# Patient Record
Sex: FEMALE | Race: WHITE | NOT HISPANIC OR LATINO
[De-identification: names, ages, dates, MRNs, and addresses within clinical notes are randomized per-mention and may not be internally consistent; named-entity substitution may affect disease eponyms.]

---

## 2019-09-22 ENCOUNTER — RESULT REVIEW (OUTPATIENT)
Age: 54
End: 2019-09-22

## 2021-04-08 ENCOUNTER — APPOINTMENT (OUTPATIENT)
Dept: GASTROENTEROLOGY | Facility: CLINIC | Age: 56
End: 2021-04-08
Payer: COMMERCIAL

## 2021-04-08 VITALS
OXYGEN SATURATION: 96 % | HEART RATE: 103 BPM | TEMPERATURE: 97.1 F | BODY MASS INDEX: 38.45 KG/M2 | SYSTOLIC BLOOD PRESSURE: 150 MMHG | WEIGHT: 245 LBS | HEIGHT: 67 IN | DIASTOLIC BLOOD PRESSURE: 90 MMHG

## 2021-04-08 DIAGNOSIS — R11.0 NAUSEA: ICD-10-CM

## 2021-04-08 DIAGNOSIS — K35.32 ACUTE APPENDICITIS W/ PERFORATION AND LOCALIZED PERITONITIS, W/O ABSCESS: ICD-10-CM

## 2021-04-08 DIAGNOSIS — K57.30 DIVERTICULOSIS OF LARGE INTESTINE W/OUT PERFORATION OR ABSCESS W/OUT BLEEDING: ICD-10-CM

## 2021-04-08 DIAGNOSIS — Z87.19 PERSONAL HISTORY OF OTHER DISEASES OF THE DIGESTIVE SYSTEM: ICD-10-CM

## 2021-04-08 DIAGNOSIS — R10.13 EPIGASTRIC PAIN: ICD-10-CM

## 2021-04-08 DIAGNOSIS — R14.0 ABDOMINAL DISTENSION (GASEOUS): ICD-10-CM

## 2021-04-08 DIAGNOSIS — K43.2 INCISIONAL HERNIA W/OUT OBSTRUCTION OR GANGRENE: ICD-10-CM

## 2021-04-08 DIAGNOSIS — R10.11 RIGHT UPPER QUADRANT PAIN: ICD-10-CM

## 2021-04-08 DIAGNOSIS — R10.31 RIGHT LOWER QUADRANT PAIN: ICD-10-CM

## 2021-04-08 PROCEDURE — 99242 OFF/OP CONSLTJ NEW/EST SF 20: CPT

## 2021-04-08 PROCEDURE — 99072 ADDL SUPL MATRL&STAF TM PHE: CPT

## 2021-04-08 RX ORDER — VALSARTAN 160 MG/1
160 TABLET ORAL
Refills: 0 | Status: ACTIVE | COMMUNITY

## 2021-04-08 RX ORDER — THYROID, PORCINE 60 MG/1
60 TABLET ORAL
Refills: 0 | Status: ACTIVE | COMMUNITY

## 2021-04-08 NOTE — HISTORY OF PRESENT ILLNESS
[Heartburn] : denies heartburn [Vomiting] : denies vomiting [Diarrhea] : denies diarrhea [Constipation] : denies constipation [Yellow Skin Or Eyes (Jaundice)] : denies jaundice [Abdominal Swelling] : denies abdominal swelling [Rectal Pain] : denies rectal pain [Nausea] : nausea [Abdominal Pain] : abdominal pain [Irritable Bowel Syndrome] : irritable bowel syndrome [Abdominal Surgery] : abdominal surgery [Appendectomy] : appendectomy [Wt Gain ___ Lbs] : no recent weight gain [Wt Loss ___ Lbs] : no recent weight loss [GERD] : no gastroesophageal reflux disease [Hiatus Hernia] : no hiatus hernia [Peptic Ulcer Disease] : no peptic ulcer disease [Pancreatitis] : no pancreatitis [Cholelithiasis] : no cholelithiasis [Kidney Stone] : no kidney stone [Inflammatory Bowel Disease] : no inflammatory bowel disease [Diverticulitis] : no diverticulitis [Alcohol Abuse] : no alcohol abuse [Malignancy] : no malignancy [Cholecystectomy] : no cholecystectomy [de-identified] : For last 6 weeks off-and-on she has been getting epigastric pain which is moderately severe to severe.  Described as burning in nature.  It is associated with nausea but no vomiting.  She denies any heartburn or dysphagia or melena.  No history of jaundice or fever.  She has tried Pepcid but it is not helping.  Her primary physician sent her for the sonogram which is awaited.\par \par Patient had similar pain in 2019 when she had complete blood work-up of CBC CMP amylase and lipase which were all normal the CT scan of abdomen at that time was also normal.  Patient had EGD and colonoscopy done at that time.  EGD revealed normal upper endoscopy though the biopsy showed changes of reflux esophagitis.  Colonoscopy revealed mild diverticulosis of sigmoid colon there was no colon polyp or malignancy.  Patient was diagnosed with irritable bowel syndrome.\par \par For many years she has been getting right upper quadrant pain radiating to the right scapular area.  Prior sonogram and  CT abdomen going back to 2008 were all normal without any evidence of cholelithiasis or cholecystitis.  Pancreas was also normal on all those exams so was the bile duct.  Patient also had a HIDA scan it revealed normal ejection fraction of the gallbladder.\par \par Patient again having right upper quadrant pain radiating to the right scapular area and right lower quadrant of the abdomen.  She also says the pain radiates down to the right thigh.  This pain is more suggestive of radicular pain of  skeltomuscular nature. [de-identified] : Patient had appendectomy for perforated appendix many years ago.  As a child she also has right inguinal hernia repair.  She had 2 surgeries for incisional hernia second time repair of hernia with mesh was done. [FreeTextEntry1] : History of obesity, hypertension, irritable bowel syndrome, anxiety.  She denies any history of diabetes mellitus, MI, angina, CHF, TIA, CVA, COPD.  History of Hashimoto's disease and hypothyroidism on Shelly Thyroid 60 mg a day.  She also take valsartan 150 mg a day for hypertension.

## 2021-04-08 NOTE — PHYSICAL EXAM
[General Appearance - Alert] : alert [General Appearance - In No Acute Distress] : in no acute distress [Sclera] : the sclera and conjunctiva were normal [PERRL With Normal Accommodation] : pupils were equal in size, round, and reactive to light [Extraocular Movements] : extraocular movements were intact [Outer Ear] : the ears and nose were normal in appearance [Oropharynx] : the oropharynx was normal [Neck Appearance] : the appearance of the neck was normal [Jugular Venous Distention Increased] : there was no jugular-venous distention [Neck Cervical Mass (___cm)] : no neck mass was observed [Thyroid Diffuse Enlargement] : the thyroid was not enlarged [Thyroid Nodule] : there were no palpable thyroid nodules [Auscultation Breath Sounds / Voice Sounds] : lungs were clear to auscultation bilaterally [Apical Impulse] : the apical impulse was normal [Heart Rate And Rhythm] : heart rate was normal and rhythm regular [Heart Sounds] : normal S1 and S2 [Heart Sounds Gallop] : no gallops [Arterial Pulses Carotid] : carotid pulses were normal with no bruits [Abdominal Aorta] : the abdominal aorta was normal [Edema] : there was no peripheral edema [Veins - Varicosity Changes] : there were no varicosital changes [Bowel Sounds] : normal bowel sounds [Abdomen Soft] : soft [Abdomen Tenderness] : non-tender [Abdomen Mass (___ Cm)] : no abdominal mass palpated [FreeTextEntry1] : No tenderness, no guarding. [Cervical Lymph Nodes Enlarged Posterior Bilaterally] : posterior cervical [Supraclavicular Lymph Nodes Enlarged Bilaterally] : supraclavicular [No CVA Tenderness] : no ~M costovertebral angle tenderness [No Spinal Tenderness] : no spinal tenderness [Abnormal Walk] : normal gait [Nail Clubbing] : no clubbing  or cyanosis of the fingernails [Musculoskeletal - Swelling] : no joint swelling seen [Motor Tone] : muscle strength and tone were normal [Skin Color & Pigmentation] : normal skin color and pigmentation [Skin Turgor] : normal skin turgor [] : no rash [No Focal Deficits] : no focal deficits [Oriented To Time, Place, And Person] : oriented to person, place, and time [Impaired Insight] : insight and judgment were intact [Affect] : the affect was normal

## 2021-04-08 NOTE — REASON FOR VISIT
[Consultation] : a consultation visit [FreeTextEntry1] : Epigastric pain and right upper quadrant pain.

## 2021-04-08 NOTE — CONSULT LETTER
[Dear  ___] : Dear  [unfilled], [Consult Letter:] : I had the pleasure of evaluating your patient, [unfilled]. [Please see my note below.] : Please see my note below. [Consult Closing:] : Thank you very much for allowing me to participate in the care of this patient.  If you have any questions, please do not hesitate to contact me. [Sincerely,] : Sincerely, [FreeTextEntry2] : Antonio Choudhury,DO [FreeTextEntry3] : Bogdan Arguello MD FACP\par

## 2021-04-08 NOTE — REVIEW OF SYSTEMS
[Abdominal Pain] : abdominal pain [Vomiting] : no vomiting [Constipation] : no constipation [Diarrhea] : no diarrhea [Heartburn] : no heartburn [Melena] : no melena [Arthralgias] : no arthralgias [Joint Pain] : no joint pain [Joint Swelling] : no joint swelling [Joint Stiffness] : no joint stiffness [Limb Pain] : limb pain [Limb Swelling] : no limb swelling [Suicidal] : not suicidal [Sleep Disturbances] : no sleep disturbances [Anxiety] : anxiety [Depression] : no depression [Change In Personality] : no personality change [Emotional Problems] : no emotional problems [Negative] : Heme/Lymph [FreeTextEntry7] : Nausea [FreeTextEntry9] : Pain in right lower rib cage area, pain in the right thigh.

## 2021-04-08 NOTE — ASSESSMENT
[FreeTextEntry1] : Patient was seen for severe epigastric pain for last 6 getting worse.  Recently she started getting pain in the right upper quadrant radiating to back and right lower quadrant radiating to right thigh.  Clinical examination does not reveal any evidence of cholecystitis or diverticulitis.  Abdomen is soft and nontender.  Site and nature of the pain is suggestive of ulcer disease differential diagnosis will include gastritis versus functional dyspepsia.  Patient is started on pantoprazole 40 mg a day.  She is a scheduled to have a sonogram of the abdomen to check any gallbladder stone scheduled for tomorrow.  If sonogram is normal and pantoprazole does not help we will add Remeron 15 mg a day for functional dyspepsia.

## 2021-04-14 DIAGNOSIS — K58.9 IRRITABLE BOWEL SYNDROME W/OUT DIARRHEA: ICD-10-CM

## 2021-04-14 RX ORDER — DICYCLOMINE HYDROCHLORIDE 20 MG/1
20 TABLET ORAL
Qty: 90 | Refills: 0 | Status: ACTIVE | COMMUNITY
Start: 2021-04-14 | End: 1900-01-01

## 2021-04-14 RX ORDER — AMITRIPTYLINE HYDROCHLORIDE 10 MG/1
10 TABLET, FILM COATED ORAL
Qty: 30 | Refills: 2 | Status: ACTIVE | COMMUNITY
Start: 2021-04-14 | End: 1900-01-01

## 2021-09-20 ENCOUNTER — APPOINTMENT (OUTPATIENT)
Dept: DISASTER EMERGENCY | Facility: CLINIC | Age: 56
End: 2021-09-20

## 2022-06-06 ENCOUNTER — APPOINTMENT (OUTPATIENT)
Dept: UROGYNECOLOGY | Facility: CLINIC | Age: 57
End: 2022-06-06
Payer: COMMERCIAL

## 2022-06-06 VITALS — HEIGHT: 66 IN | DIASTOLIC BLOOD PRESSURE: 80 MMHG | SYSTOLIC BLOOD PRESSURE: 128 MMHG | TEMPERATURE: 98 F

## 2022-06-06 DIAGNOSIS — N32.81 OVERACTIVE BLADDER: ICD-10-CM

## 2022-06-06 DIAGNOSIS — R35.0 FREQUENCY OF MICTURITION: ICD-10-CM

## 2022-06-06 DIAGNOSIS — N81.6 RECTOCELE: ICD-10-CM

## 2022-06-06 DIAGNOSIS — R39.15 URGENCY OF URINATION: ICD-10-CM

## 2022-06-06 LAB
BILIRUB UR QL STRIP: NORMAL
CLARITY UR: CLEAR
COLLECTION METHOD: NORMAL
GLUCOSE UR-MCNC: NORMAL
HCG UR QL: 0.2 EU/DL
HGB UR QL STRIP.AUTO: NORMAL
KETONES UR-MCNC: NORMAL
LEUKOCYTE ESTERASE UR QL STRIP: NORMAL
NITRITE UR QL STRIP: NORMAL
PH UR STRIP: 5.5
PROT UR STRIP-MCNC: NORMAL
SP GR UR STRIP: 1.01

## 2022-06-06 PROCEDURE — 99204 OFFICE O/P NEW MOD 45 MIN: CPT | Mod: 25

## 2022-06-06 PROCEDURE — 51701 INSERT BLADDER CATHETER: CPT

## 2022-06-06 RX ORDER — LINACLOTIDE 72 UG/1
72 CAPSULE, GELATIN COATED ORAL
Refills: 0 | Status: ACTIVE | COMMUNITY

## 2022-06-06 RX ORDER — TACROLIMUS 1 MG/G
0.1 OINTMENT TOPICAL
Qty: 30 | Refills: 0 | Status: DISCONTINUED | COMMUNITY
Start: 2022-03-01

## 2022-06-06 RX ORDER — LINACLOTIDE 145 UG/1
145 CAPSULE, GELATIN COATED ORAL
Qty: 30 | Refills: 0 | Status: DISCONTINUED | COMMUNITY
Start: 2021-12-06

## 2022-06-06 RX ORDER — HALOBETASOL PROPIONATE 0.5 MG/G
0.05 CREAM TOPICAL
Qty: 50 | Refills: 0 | Status: DISCONTINUED | COMMUNITY
Start: 2021-12-29

## 2022-06-06 RX ORDER — TRIAMCINOLONE ACETONIDE 55 MCG
AEROSOL WITH ADAPTER (GRAM) NASAL
Refills: 0 | Status: ACTIVE | COMMUNITY

## 2022-06-06 RX ORDER — MIRABEGRON 50 MG/1
50 TABLET, FILM COATED, EXTENDED RELEASE ORAL
Qty: 30 | Refills: 6 | Status: ACTIVE | COMMUNITY
Start: 2022-06-06 | End: 1900-01-01

## 2022-06-06 NOTE — DISCUSSION/SUMMARY
[FreeTextEntry1] : \par Keely presents with urinary frequency and urgency. She has nocturia 3-12 times a night. We reviewed possible etiologies of her symptoms including OAB vs IC/BPS. I recommend the following:\par -Start behavioral and fluid modifications \par -Start bladder training\par -Start a trial of Myrbetriq 50 mg daily. She has a h/o severe constipation on Linzess, I do not recommend anticholinergic medication. \par -She will RTO in 2 months for follow up. If symptoms persist, will consider changing to IC/BPS treatment with Amitriptyline QHS. \par \par The following treatment plan was designed for this patient and provided to her in written form and reviewed extensively. Patient was given a copy to take home: \par For Urinary Symptoms:\par **Total fluids: 1.5-2 Liters per day\par   -Ex. 8 oz coffee, 2-3 bottles of water (Each bottle is 16.9 oz). No flavoring added. No seltzer or Pelligrino!\par  -Drink slowly throughout day, no binge drinking (each bottle of water should take you hours, not minutes)\par **Avoid: iced tea, tea, juices, alcohol, carbonation (soda, seltzer), spicy foods, citrus, artificial sweeteners, chocolate\par **Stop eating and drinking 2-3 hours before bedtime (sips ok)\par \par -Start Myrbetriq 50 mg daily in the morning. You must continue the fluid changes while on medication. Medication may take 3-4 weeks to start working. \par \par If not covered by insurance, call insurance company and get list of overactive bladder medications that are covered. Call my office with list (Mon-Fri) and we will change medication\par

## 2022-06-06 NOTE — HISTORY OF PRESENT ILLNESS
[Rectal Prolapse] : no [Unable To Restrain Bowel Movement] : no [Feelings Of Urinary Urgency] : severe [Hematuria] : severe [x3+] : nocturia three or more  times a night [Urinary Frequency] : no [Pain During Urination (Dysuria)] : no [Urinary Tract Infection] : severe [] : years ago [Constipation Obstructed Defecation] : severe [Pelvic Pain] : mild [de-identified] : 3-12 times [FreeTextEntry6] : improved on linzess [de-identified] : bladder pressure [FreeTextEntry1] : Keely is a 55 yo who presents due to urinary frequency/urgency. She reports that she voids >10x/day during the day and nocturia 3-4x. She has not been on medication for this in the past. Underwent workup with Dr Packer in 2020 for microhematuria which was negative. She also reports that she has been told she has a rectocele in the past, however denies symptoms of vaginal bulging. \par \par PMHx: HTN, Hashimotos, constipation, hx of hernia, obesity \par PSHx: appendectomy, hernia repair, \par SHx:, nonsmoker, works as a  \par Fluids: 1 cup coffee, 1/2 gallon of water/javy/lemon

## 2022-06-06 NOTE — PHYSICAL EXAM
[Chaperone Present] : A chaperone was present in the examining room during all aspects of the physical examination [Labia Majora] : were normal [Labia Minora] : were normal [Normal Appearance] : general appearance was normal [Aa ____] : Aa [unfilled] [Ba ____] : Ba [unfilled] [C ____] : C [unfilled] [GH ____] : GH [unfilled] [PB ____] : PB [unfilled] [TVL ____] : TVL  [unfilled] [Ap ____] : Ap [unfilled] [Bp ____] : Bp [unfilled] [D ____] : D [unfilled] [Normal] : no abnormalities [Exam Deferred] : was deferred [FreeTextEntry1] : General: Well, appearing. Alert and orientated. No acute distress\par HEENT: Normocephalic, atraumatic and extraocular muscles appear to be intact \par Neck: Full range of motion, no obvious lymphadenopathy, deformities, or masses noted \par Respiratory: Speaking in full sentences comfortably, normal work of breathing and no cough during visit\par Musculoskeletal: active full range of motion in extremities \par Extremities: No upper extremity edema noted\par Skin: no obvious rash or skin lesions\par Neuro: Orientated X 3, speech is fluent, normal rate\par Psych: Normal mood and affect\par  [Tenderness] : ~T no ~M abdominal tenderness observed [Distended] : not distended

## 2022-06-06 NOTE — PROCEDURE
[Fluid Management] : fluid management [Bladder Training] : bladder training [FreeTextEntry1] : Sterile straight catheterization was performed to measure a postvoid residual volume and rule out urinary tract infection which was 30 cc\par

## 2022-06-07 LAB
APPEARANCE: CLEAR
BACTERIA: NEGATIVE
BILIRUBIN URINE: NEGATIVE
BLOOD URINE: NORMAL
COLOR: NORMAL
GLUCOSE QUALITATIVE U: NEGATIVE
HYALINE CASTS: 0 /LPF
KETONES URINE: NEGATIVE
LEUKOCYTE ESTERASE URINE: NEGATIVE
MICROSCOPIC-UA: NORMAL
NITRITE URINE: NEGATIVE
PH URINE: 6.5
PROTEIN URINE: NEGATIVE
RED BLOOD CELLS URINE: 0 /HPF
SPECIFIC GRAVITY URINE: 1.01
SQUAMOUS EPITHELIAL CELLS: 0 /HPF
UROBILINOGEN URINE: NORMAL
WHITE BLOOD CELLS URINE: 0 /HPF

## 2022-06-08 LAB — BACTERIA UR CULT: NORMAL

## 2022-08-03 ENCOUNTER — RESULT REVIEW (OUTPATIENT)
Age: 57
End: 2022-08-03

## 2022-08-08 ENCOUNTER — APPOINTMENT (OUTPATIENT)
Dept: UROGYNECOLOGY | Facility: CLINIC | Age: 57
End: 2022-08-08

## 2023-06-13 ENCOUNTER — FORM ENCOUNTER (OUTPATIENT)
Age: 58
End: 2023-06-13

## 2023-06-13 ENCOUNTER — APPOINTMENT (OUTPATIENT)
Dept: ORTHOPEDIC SURGERY | Facility: CLINIC | Age: 58
End: 2023-06-13
Payer: COMMERCIAL

## 2023-06-13 VITALS — WEIGHT: 220 LBS | BODY MASS INDEX: 35.36 KG/M2 | HEIGHT: 66 IN

## 2023-06-13 PROCEDURE — 73010 X-RAY EXAM OF SHOULDER BLADE: CPT | Mod: RT

## 2023-06-13 PROCEDURE — 73030 X-RAY EXAM OF SHOULDER: CPT | Mod: RT

## 2023-06-13 PROCEDURE — 20611 DRAIN/INJ JOINT/BURSA W/US: CPT | Mod: RT

## 2023-06-13 PROCEDURE — J3490M: CUSTOM

## 2023-06-13 PROCEDURE — 99204 OFFICE O/P NEW MOD 45 MIN: CPT | Mod: 25

## 2023-06-13 RX ORDER — DICLOFENAC SODIUM 1% 10 MG/G
1 GEL TOPICAL TWICE DAILY
Qty: 1 | Refills: 3 | Status: ACTIVE | COMMUNITY
Start: 2023-06-13 | End: 1900-01-01

## 2023-06-13 RX ORDER — DICLOFENAC SODIUM 75 MG/1
75 TABLET, DELAYED RELEASE ORAL TWICE DAILY
Qty: 60 | Refills: 2 | Status: COMPLETED | COMMUNITY
Start: 2023-06-13 | End: 2023-09-11

## 2023-06-13 NOTE — REVIEW OF SYSTEMS
[Arthralgia] : arthralgia [Joint Pain] : joint pain [Negative] : Endocrine [FreeTextEntry9] : right shoulder

## 2023-06-13 NOTE — PHYSICAL EXAM
[Right] : right shoulder [5 ___] : forward flexion 5[unfilled]/5 [5___] : internal rotation 5[unfilled]/5 [] : pain with strength testing [FreeTextEntry9] : FE R 140 L160\par ER 40   L 60\par IR PLB  L L4

## 2023-06-13 NOTE — HISTORY OF PRESENT ILLNESS
[Sudden] : sudden [9] : 9 [5] : 5 [Sharp] : sharp [Shooting] : shooting [Tightness] : tightness [Intermittent] : intermittent [Household chores] : household chores [Leisure] : leisure [Work] : work [Sleep] : sleep [Rest] : rest [Meds] : meds [Ice] : ice [Extending back] : extending back [de-identified] : 6/13/23: 56 yo LHD female with right shoulder pain since Feb 2023. She reports doing tricep extension with 3lb weight and feeling a pull and pain in her arm. She states pain has been gradually worsening. There is pain with reaching overhead and behind her back. She has trouble getting dressed. There is pain into her scapula. There is pain radiating down her arm. Denies numbness/tingling. She takes Advil. No prior injuries. \par \par PMHx: Hashimotos [] : no [FreeTextEntry1] : right shoulder [FreeTextEntry4] : 6 months [FreeTextEntry5] : was using a 3 lb weight, extending arm and felt like her shoulder dislocated\par got progressively worse over time [FreeTextEntry7] : right arm, back of neck [FreeTextEntry9] : hot shower [de-identified] : laying on right side, twisting motion

## 2023-08-15 ENCOUNTER — APPOINTMENT (OUTPATIENT)
Dept: ORTHOPEDIC SURGERY | Facility: CLINIC | Age: 58
End: 2023-08-15
Payer: COMMERCIAL

## 2023-08-15 VITALS — HEIGHT: 66 IN | WEIGHT: 220 LBS | BODY MASS INDEX: 35.36 KG/M2

## 2023-08-15 DIAGNOSIS — M75.01 ADHESIVE CAPSULITIS OF RIGHT SHOULDER: ICD-10-CM

## 2023-08-15 PROCEDURE — 99213 OFFICE O/P EST LOW 20 MIN: CPT

## 2023-08-15 NOTE — REVIEW OF SYSTEMS
[Arthralgia] : arthralgia [Joint Pain] : joint pain [Negative] : Heme/Lymph [FreeTextEntry9] : right shoulder

## 2023-08-15 NOTE — HISTORY OF PRESENT ILLNESS
[Sudden] : sudden [9] : 9 [5] : 5 [Sharp] : sharp [Shooting] : shooting [Tightness] : tightness [Intermittent] : intermittent [Household chores] : household chores [Leisure] : leisure [Work] : work [Sleep] : sleep [Rest] : rest [Meds] : meds [Ice] : ice [Extending back] : extending back [de-identified] : 8/15/23: Here for follow up. She reports improvement in some motions but continues to have stiffness reaching across her body and behind her back. She went to PT but stopped recently. She stretches at home.  6/13/23: 56 yo LHD female with right shoulder pain since Feb 2023. She reports doing tricep extension with 3lb weight and feeling a pull and pain in her arm. She states pain has been gradually worsening. There is pain with reaching overhead and behind her back. She has trouble getting dressed. There is pain into her scapula. There is pain radiating down her arm. Denies numbness/tingling. She takes Advil. No prior injuries.   PMHx: Hashimotos [] : no [FreeTextEntry1] : right shoulder [FreeTextEntry4] : 6 months [FreeTextEntry5] : was using a 3 lb weight, extending arm and felt like her shoulder dislocated\par  got progressively worse over time [FreeTextEntry7] : right arm, back of neck [FreeTextEntry9] : hot shower [de-identified] : laying on right side, twisting motion

## 2023-08-15 NOTE — PHYSICAL EXAM
[Right] : right shoulder [5 ___] : forward flexion 5[unfilled]/5 [5___] : internal rotation 5[unfilled]/5 [] : pain with strength testing [FreeTextEntry9] : FE R 145P  L160 ER 50   L 60 IR PLB  L L4

## 2023-08-15 NOTE — ASSESSMENT
[FreeTextEntry1] : Right shoulder adhesive capsulitis. She did PT but it was too aggressive and painful. Continue HEP for stretching. Diclofenac PO or gel BID. R GH injection given 6/13/23, mild relief. She will try acupuncture. RTO 2-3 months.

## 2023-11-21 ENCOUNTER — APPOINTMENT (OUTPATIENT)
Dept: ORTHOPEDIC SURGERY | Facility: CLINIC | Age: 58
End: 2023-11-21

## 2024-07-18 ENCOUNTER — APPOINTMENT (OUTPATIENT)
Dept: ORTHOPEDIC SURGERY | Facility: CLINIC | Age: 59
End: 2024-07-18
Payer: COMMERCIAL

## 2024-07-18 VITALS — HEIGHT: 66 IN | BODY MASS INDEX: 35.36 KG/M2 | WEIGHT: 220 LBS

## 2024-07-18 DIAGNOSIS — M72.2 PLANTAR FASCIAL FIBROMATOSIS: ICD-10-CM

## 2024-07-18 DIAGNOSIS — Z00.00 ENCOUNTER FOR GENERAL ADULT MEDICAL EXAMINATION W/OUT ABNORMAL FINDINGS: ICD-10-CM

## 2024-07-18 PROCEDURE — 73630 X-RAY EXAM OF FOOT: CPT | Mod: LT

## 2024-07-18 PROCEDURE — 99204 OFFICE O/P NEW MOD 45 MIN: CPT

## 2024-07-22 ENCOUNTER — APPOINTMENT (OUTPATIENT)
Dept: NEUROLOGY | Facility: CLINIC | Age: 59
End: 2024-07-22
Payer: COMMERCIAL

## 2024-07-22 PROCEDURE — 95886 MUSC TEST DONE W/N TEST COMP: CPT

## 2024-07-22 PROCEDURE — 95913 NRV CNDJ TEST 13/> STUDIES: CPT

## 2024-07-24 ENCOUNTER — APPOINTMENT (OUTPATIENT)
Dept: MRI IMAGING | Facility: CLINIC | Age: 59
End: 2024-07-24
Payer: COMMERCIAL

## 2024-07-24 PROCEDURE — 73721 MRI JNT OF LWR EXTRE W/O DYE: CPT | Mod: RT

## 2024-08-01 ENCOUNTER — APPOINTMENT (OUTPATIENT)
Dept: ORTHOPEDIC SURGERY | Facility: CLINIC | Age: 59
End: 2024-08-01
Payer: COMMERCIAL

## 2024-08-01 DIAGNOSIS — M72.2 PLANTAR FASCIAL FIBROMATOSIS: ICD-10-CM

## 2024-08-01 PROCEDURE — 99214 OFFICE O/P EST MOD 30 MIN: CPT

## 2024-08-01 NOTE — DATA REVIEWED
[EMG Nerve Conduction] : A EMG Nerve Conduction test was completed of the [Lower extremity] : lower extremity [Negative] : negative [MRI] : MRI [Right] : of the right [Ankle] : ankle [I reviewed the films/CD and additionally noted] : I reviewed the films/CD and additionally noted [FreeTextEntry1] : plantar fasciitis; chronic peroneal tear

## 2024-08-01 NOTE — IMAGING
[Right] : right foot [There are no fractures, subluxations or dislocations. No significant abnormalities are seen] : There are no fractures, subluxations or dislocations. No significant abnormalities are seen [de-identified] : RLE Inspection of the ankle, foot and lower leg is as follows: no abrasions or lacerations, no swelling, no erythema and no gross deformity Palpation of the ankle, foot and lower leg is as follows: Tenderness at plantar fascia insertion.no peroneal tendonitis.  Range of motion of the ankle, foot and lower leg is as follows: dorsiflexion to 20 degrees, plantar flexion to 40 degrees, inversion to 30 degrees and eversion to 20 degrees. Strength testing of the ankle, foot and lower leg is as follows: Ankle dorsiflexion strength is 5/5, Ankle plantar flexion strength is 5/5, Ankle inversion strength is 5/5 and Ankle eversion strength is 5/5. Special Testing of the ankle, foot and lower leg are as follows: no pain with heel compression. Vascular testing of the ankle, foot and lower leg are as follows: Dorsalis Pedis (DP) Pulse is 2+. Sensation testing of the ankle, foot and lower leg are as follows: Sensation present to light touch in all distributions.

## 2024-08-01 NOTE — ASSESSMENT
[FreeTextEntry1] : discussed dx at length again recommended physical therapy -- patient refuses. states no interest in therapy patient asking about placental injections -- advised would not be evidence based treatment advised no surgical intervention indicated spoke at length with patient that PT is gold standard treatment for this issue and recommended that despite doing exercises on her own may need further guidance under the care of PT patient stated visit was a "waste of time" and then left